# Patient Record
Sex: MALE | Race: OTHER
[De-identification: names, ages, dates, MRNs, and addresses within clinical notes are randomized per-mention and may not be internally consistent; named-entity substitution may affect disease eponyms.]

---

## 2019-11-25 ENCOUNTER — HOSPITAL ENCOUNTER (OUTPATIENT)
Dept: HOSPITAL 72 - PAN | Age: 84
Discharge: HOME | End: 2019-11-25
Payer: MEDICARE

## 2019-11-25 VITALS — DIASTOLIC BLOOD PRESSURE: 70 MMHG | SYSTOLIC BLOOD PRESSURE: 131 MMHG

## 2019-11-25 DIAGNOSIS — Z86.73: ICD-10-CM

## 2019-11-25 DIAGNOSIS — E03.9: ICD-10-CM

## 2019-11-25 DIAGNOSIS — E78.00: ICD-10-CM

## 2019-11-25 DIAGNOSIS — I10: ICD-10-CM

## 2019-11-25 DIAGNOSIS — Z86.19: ICD-10-CM

## 2019-11-25 DIAGNOSIS — K57.90: ICD-10-CM

## 2019-11-25 DIAGNOSIS — R94.5: Primary | ICD-10-CM

## 2019-11-25 PROCEDURE — 99212 OFFICE O/P EST SF 10 MIN: CPT

## 2019-12-02 ENCOUNTER — HOSPITAL ENCOUNTER (OUTPATIENT)
Dept: HOSPITAL 72 - PAN | Age: 84
Discharge: HOME | End: 2019-12-02
Payer: MEDICARE

## 2019-12-02 VITALS — SYSTOLIC BLOOD PRESSURE: 134 MMHG | DIASTOLIC BLOOD PRESSURE: 79 MMHG

## 2019-12-02 DIAGNOSIS — B19.10: ICD-10-CM

## 2019-12-02 DIAGNOSIS — E78.00: ICD-10-CM

## 2019-12-02 DIAGNOSIS — E03.9: ICD-10-CM

## 2019-12-02 DIAGNOSIS — N40.0: Primary | ICD-10-CM

## 2019-12-02 DIAGNOSIS — K57.90: ICD-10-CM

## 2019-12-02 DIAGNOSIS — Z86.73: ICD-10-CM

## 2019-12-02 DIAGNOSIS — I10: ICD-10-CM

## 2019-12-02 NOTE — GENERAL PROGRESS NOTE
Assessment/Plan


Assessment/Plan:


1. BPH.


2. Hypertension.


3. CVA.


4. Hypothyroidism.


5. Hypercholesterolemia.


6. Diverticulosis


7, hep B





start Entecavir


RTC one month





Subjective


ROS Limited/Unobtainable:  Yes


Allergies:  


Coded Allergies:  


     No Known Allergies (Unverified , 9/3/14)





Objective


General Appearance:  alert


EENT:  normal ENT inspection


Neck:  supple


Cardiovascular:  normal rate


Respiratory/Chest:  decreased breath sounds


Abdomen:  normal bowel sounds, non tender, soft


Extremities:  non-tender











Gage Dejesus MD Dec 2, 2019 14:18

## 2020-02-19 ENCOUNTER — HOSPITAL ENCOUNTER (OUTPATIENT)
Dept: HOSPITAL 72 - PAN | Age: 85
Discharge: HOME | End: 2020-02-19
Payer: MEDICARE

## 2020-02-19 DIAGNOSIS — I10: ICD-10-CM

## 2020-02-19 DIAGNOSIS — K64.9: ICD-10-CM

## 2020-02-19 DIAGNOSIS — K29.40: ICD-10-CM

## 2020-02-19 DIAGNOSIS — N40.0: ICD-10-CM

## 2020-02-19 DIAGNOSIS — Z86.73: ICD-10-CM

## 2020-02-19 DIAGNOSIS — K57.90: Primary | ICD-10-CM

## 2020-02-19 DIAGNOSIS — E03.9: ICD-10-CM

## 2020-02-19 DIAGNOSIS — E78.00: ICD-10-CM

## 2020-02-19 PROCEDURE — 99212 OFFICE O/P EST SF 10 MIN: CPT

## 2020-02-19 NOTE — GENERAL PROGRESS NOTE
Assessment/Plan


Assessment/Plan:


Assessment/Plan


Problem List:  


(1) Diverticulosis


ICD Codes:  K57.90 - Diverticulosis


SNOMED:  994246199


(2) Atrophy, gastritis


ICD Codes:  K29.40 - Atrophy, gastritis


SNOMED:  93811749


(3) Hemorrhoids


ICD Codes:  K64.9 - Hemorrhoids


SNOMED:  02200226


(4) Hypercholesteremia


ICD Codes:  E78.0 - Hypercholesteremia


SNOMED:  14873342


(5) HTN (hypertension)


ICD Codes:  I10 - HTN (hypertension)


SNOMED:  39263637


(6) BPH (benign prostatic hyperplasia)


ICD Codes:  N40.0 - BPH (benign prostatic hyperplasia)


SNOMED:  223720956


Assessment/Plan:


Assessment/Plan


Assessment/Plan:


1. BPH.


2. Hypertension.


3. CVA.


4. Hypothyroidism.


5. Hypercholesterolemia.


6. Diverticulosis


7, hep B





Entecavir


labs for today


RTC one month





Subjective


ROS Limited/Unobtainable:  Yes


Allergies:  


Coded Allergies:  


     No Known Allergies (Unverified , 9/3/14)





Objective


General Appearance:  alert


EENT:  PERRL/EOMI


Neck:  normal alignment


Cardiovascular:  normal rate


Respiratory/Chest:  lungs clear


Abdomen:  normal bowel sounds, non tender, soft


Extremities:  non-tender











Gage Dejesus MD Feb 19, 2020 14:04